# Patient Record
Sex: MALE | Race: WHITE | NOT HISPANIC OR LATINO | Employment: OTHER | URBAN - METROPOLITAN AREA
[De-identification: names, ages, dates, MRNs, and addresses within clinical notes are randomized per-mention and may not be internally consistent; named-entity substitution may affect disease eponyms.]

---

## 2018-08-15 ENCOUNTER — HOSPITAL ENCOUNTER (OUTPATIENT)
Dept: GENERAL RADIOLOGY | Facility: HOSPITAL | Age: 58
Discharge: HOME | End: 2018-08-15
Attending: ANESTHESIOLOGY | Admitting: ANESTHESIOLOGY

## 2020-08-03 ENCOUNTER — APPOINTMENT (OUTPATIENT)
Dept: CT IMAGING | Facility: HOSPITAL | Age: 60
End: 2020-08-03

## 2020-08-03 ENCOUNTER — HOSPITAL ENCOUNTER (EMERGENCY)
Facility: HOSPITAL | Age: 60
Discharge: HOME | End: 2020-08-03
Admitting: EMERGENCY MEDICINE

## 2020-08-03 VITALS
OXYGEN SATURATION: 98 % | WEIGHT: 182.54 LBS | RESPIRATION RATE: 14 BRPM | DIASTOLIC BLOOD PRESSURE: 78 MMHG | HEART RATE: 79 BPM | TEMPERATURE: 98.1 F | SYSTOLIC BLOOD PRESSURE: 137 MMHG | BODY MASS INDEX: 27.04 KG/M2 | HEIGHT: 69 IN

## 2020-08-03 DIAGNOSIS — H11.31 SUBCONJUNCTIVAL HEMORRHAGE OF RIGHT EYE: ICD-10-CM

## 2020-08-03 DIAGNOSIS — J32.2 ETHMOID SINUSITIS, UNSPECIFIED CHRONICITY: ICD-10-CM

## 2020-08-03 DIAGNOSIS — R51.9 NONINTRACTABLE HEADACHE, UNSPECIFIED CHRONICITY PATTERN, UNSPECIFIED HEADACHE TYPE: Primary | ICD-10-CM

## 2020-08-03 PROCEDURE — 99283 EMERGENCY DEPT VISIT LOW MDM: CPT

## 2020-08-03 PROCEDURE — 70486 CT MAXILLOFACIAL W/O DYE: CPT

## 2020-08-03 RX ORDER — OXYCODONE AND ACETAMINOPHEN 10; 325 MG/1; MG/1
1 TABLET ORAL EVERY 6 HOURS PRN
COMMUNITY

## 2020-08-03 RX ORDER — OMEPRAZOLE 40 MG/1
CAPSULE, DELAYED RELEASE ORAL
COMMUNITY
Start: 2013-12-23

## 2020-08-03 RX ORDER — ASPIRIN 81 MG/1
81 TABLET, CHEWABLE ORAL DAILY
COMMUNITY

## 2020-08-03 RX ORDER — FLUTICASONE PROPIONATE 50 MCG
2 SPRAY, SUSPENSION (ML) NASAL DAILY
Qty: 9.9 ML | Refills: 0 | Status: SHIPPED | OUTPATIENT
Start: 2020-08-03

## 2020-08-03 NOTE — DISCHARGE INSTRUCTIONS
your blood pressure today is higher than what is currently recommended by Medicare standards and we suggest follow up with your pcp within one month for reevaluation

## 2020-08-03 NOTE — ED PROVIDER NOTES
Subjective   Chief complaint: Headache      Context: Patient is a 59-year-old male who comes in complaining of a headache for the last month.  States is been frontal and intermittent.  It is not the worst headache of his life and he denies any thunderclap type noise.  He states he does have a cough that is not necessarily unusual for him because he is a half a pack to a pack and a half a day smoker.  He states he noticed his right eye was a little bit red this morning when he looked in the mirror but denies any pain or visual changes.  He denies any fever.  He has had some congestion.  He denies any unilateral focal deficits confusion ataxia or lethargy.  Denies any weakness or paresthesias.  States he called his doctor's office today and spoke with the  who was unable to get him an appointment today and instructed him to come to the ER for evaluation.   Denies any bug bites or tick bites    Duration: A month    Timing: Intermittent    Severity: Mild    Associated symptoms: Has not been taking any over-the-counter medications or decongestants for his headache          PCP:  none          Review of Systems   Constitutional: Negative for fever.   HENT: Positive for congestion.    Eyes: Positive for redness.   Respiratory: Negative.    Cardiovascular: Negative.    Gastrointestinal: Negative.    Musculoskeletal: Negative.    Skin: Negative.    Allergic/Immunologic: Positive for immunocompromised state.   Neurological: Positive for headaches. Negative for dizziness, tremors, seizures, syncope, facial asymmetry, speech difficulty, weakness, light-headedness and numbness.   Hematological: Does not bruise/bleed easily.   Psychiatric/Behavioral: Negative for confusion.       No past medical history on file.    No Known Allergies    No past surgical history on file.    No family history on file.    Social History     Socioeconomic History   • Marital status:      Spouse name: Not on file   • Number of  children: Not on file   • Years of education: Not on file   • Highest education level: Not on file           Objective   Physical Exam    Vital signs and triage nurse note reviewed.   Constitutional: Awake, alert; well-developed and well-nourished. No acute distress is noted.   HEENT: Normocephalic, atraumatic; pupils are PERRL with intact EOM; oropharynx is pink and moist without exudate or erythema.  Right subconjunctival hemorrhage, no pain with EOMs.  Specifically there is no erythema or cellulitic changes noted to the face  TMs intact bilaterally without erythema bulging bleeding or discharge.  Some tenderness over the frontal sinus   neck: Supple, full range of motion without pain; no cervical lymphadenopathy.  Negative Brudzinski   Cardiovascular: Regular rate and rhythm, normal S1-S2.   Pulmonary: Respiratory effort regular nonlabored, breath sounds clear to auscultation all fields.   Abdomen: Soft, nontender nondistended with normoactive bowel sounds; no rebound or guarding.   Musculoskeletal: Independent range of motion of all extremities with no palpable tenderness or edema.   Neuro: Alert oriented x3, speech is clear and appropriate, GCS 15.  Intact coordination no pronator drift.  Negative Romberg  Skin:  Fleshtone warm, dry, intact; no erythematous or petechial rash or lesion         Procedures           ED Course      Labs Reviewed - No data to display  Medications - No data to display  Ct Sinus Without Contrast    Result Date: 8/3/2020   1. Mild to moderate bilateral ethmoid sinus mucosal thickening. 2. The ostiomeatal complexes remain patent. 3. No paranasal sinus air-fluid levels. 4. Bony nasal septal deviation to the right with apical spur.  Electronically Signed By-Dr. Catie Askew MD On:8/3/2020 1:10 PM This report was finalized on [id number] by Dr. Catie Askew MD.                                         MDM  Number of Diagnoses or Management Options  Ethmoid sinusitis, unspecified  chronicity:   Nonintractable headache, unspecified chronicity pattern, unspecified headache type:   Subconjunctival hemorrhage of right eye:   Diagnosis management comments:        Comorbidities:  has no past medical history on file.  Differentials: Sinusitis septal deviation not all inclusive of differentials considered  Radiology interpretation:  X-rays reviewed by me and interpreted by radiologist,   Ct Sinus Without Contrast    Result Date: 8/3/2020   1. Mild to moderate bilateral ethmoid sinus mucosal thickening. 2. The ostiomeatal complexes remain patent. 3. No paranasal sinus air-fluid levels. 4. Bony nasal septal deviation to the right with apical spur.  Electronically Signed By-Dr. Catie Askew MD On:8/3/2020 1:10 PM This report was finalized on [id number] by Dr. Catie Askew MD.         Appropriate PPE worn during exam.  Patient was given prescription for Flonase.  There were no air-fluid levels indicative of acute sinus infection requiring antibiotics.  We discussed the importance of follow-up with ENT    i discussed findings with patient who voices understanding of discharge instructions, signs and symptoms requiring return to ED; discharged improved and in stable condition with follow up for re-evaluation.        Final diagnoses:   Nonintractable headache, unspecified chronicity pattern, unspecified headache type   Subconjunctival hemorrhage of right eye   Ethmoid sinusitis, unspecified chronicity            Cary Diaz, APRN  08/03/20 [id number]

## 2020-08-03 NOTE — ED NOTES
Pt presents to the ED with frontal pressure headache for about a month last night he squeezed his nose and this morning woke with busted blood vessel to right eye. Pt denies pain in the eye.      Areli Sims RN  08/03/20 [id number]

## 2020-08-03 NOTE — ED NOTES
Pt states he called his doctor office today and the  told him he should come to the ED.      Areli Sims RN  08/03/20 8804